# Patient Record
Sex: MALE | URBAN - METROPOLITAN AREA
[De-identification: names, ages, dates, MRNs, and addresses within clinical notes are randomized per-mention and may not be internally consistent; named-entity substitution may affect disease eponyms.]

---

## 2022-11-28 ENCOUNTER — OFFICE VISIT (OUTPATIENT)
Dept: URBAN - METROPOLITAN AREA CLINIC 44 | Facility: CLINIC | Age: 42
End: 2022-11-28
Payer: OTHER GOVERNMENT

## 2022-11-28 DIAGNOSIS — E11.9 TYPE 2 DIABETES MELLITUS W/O COMPLICATION: Primary | ICD-10-CM

## 2022-11-28 DIAGNOSIS — Z79.84 LONG TERM (CURRENT) USE OF ORAL HYPOGLYCEMIC DRUGS: ICD-10-CM

## 2022-11-28 PROCEDURE — 92004 COMPRE OPH EXAM NEW PT 1/>: CPT | Performed by: OPTOMETRIST

## 2022-11-28 ASSESSMENT — KERATOMETRY
OD: 42.38
OS: 42.38

## 2022-11-28 ASSESSMENT — INTRAOCULAR PRESSURE
OS: 12
OD: 11

## 2022-11-28 ASSESSMENT — VISUAL ACUITY
OS: 20/20
OD: 20/20

## 2022-11-28 NOTE — IMPRESSION/PLAN
Impression: Type 2 diabetes mellitus w/o complication: W30.7. No vascular abnormalities associated with DM noted per exam and OCT. Plan: PLAN: Stressed importance of regular follow ups with PCP. Discussed with patient to maintain A1C at 7.0 or below will greatly decreased chances/progression of retinopathy. Send report to PCP. RTC 12 months for complete and OCT mac.  OPTOS (If DM for >10yrs)

## 2025-05-06 ENCOUNTER — OFFICE VISIT (OUTPATIENT)
Facility: LOCATION | Age: 45
End: 2025-05-06
Payer: COMMERCIAL

## 2025-05-06 DIAGNOSIS — H04.123 TEAR FILM INSUFFICIENCY OF BILATERAL LACRIMAL GLANDS: ICD-10-CM

## 2025-05-06 DIAGNOSIS — E11.9 TYPE 2 DIABETES MELLITUS W/O COMPLICATION: Primary | ICD-10-CM

## 2025-05-06 DIAGNOSIS — H52.13 MYOPIA, BILATERAL: ICD-10-CM

## 2025-05-06 DIAGNOSIS — H25.13 AGE-RELATED NUCLEAR CATARACT, BILATERAL: ICD-10-CM

## 2025-05-06 PROCEDURE — 92014 COMPRE OPH EXAM EST PT 1/>: CPT | Performed by: OPTOMETRIST

## 2025-05-06 ASSESSMENT — KERATOMETRY
OD: 42.25
OS: 42.63

## 2025-05-06 ASSESSMENT — INTRAOCULAR PRESSURE
OS: 14
OD: 14

## 2025-05-06 ASSESSMENT — VISUAL ACUITY
OD: 20/20
OS: 20/20